# Patient Record
(demographics unavailable — no encounter records)

---

## 2024-10-10 NOTE — HISTORY OF PRESENT ILLNESS
[FreeTextEntry1] :   ***The patient was asked about all of the following, positive responses are listed below*** Gynecologic History: History of breast biopsy or breast cyst aspiration; Pain during or after intercourse; Vaginal bleeding or spotting between periods; Breast discharge; Abnormal or irregular periods; Abnormal vaginal discharge; history of Gynecologic cancer; history of Ovarian cysts; history of Fibroids; history of frequent Urinary tract infections; Urinary problems (i.e. frequency, urgency, difficulty, leaking); history of Pelvic pain/pelvic inflammatory disease; history of chronic Vaginal infections (i.e., yeast, Trichomonas, bacterial Vaginosis) Contraceptive History: What she is currently using for birth control; If she was requesting birth control today. Sexually Transmitted Disease History: A history of any of the following sexually transmitted diseases: Gonorrhea; Chlamydia; Syphilis; Herpes; HPV/Warts; Hepatitis; HIV/AIDS and whether she feels that she is at risk for HIV/AIDS and wants to be tested for HIV. Abnormal Pap Smear History: History of abnormal Pap smear; evaluation of any abnormalities; treatment of any abnormalities Date and result of her last Pap smear. NABIL in Utero Exposure History: A history of personal or maternal NABIL exposure. Hospitalizations (other than childbirth) Blood transfusions. Review of Systems: General: weight change, general health; Head: headaches, vertigo; Eyes: vision, diplopia; Ears: change in hearing, tinnitus; Nose: epistaxis, chronic rhinitis; Mouth: gingival bleeding; Neck: stiffness, pain, masses; Chest: dyspnea, wheezing, hemoptysis, persistent cough; Heart: chest pains, palpitations; Abdomen: liver disease, abdominal discomfort, Indigestion, Nausea/Vomiting, Constipation/Diarrhea, Blood in stool, change in bowel habits; : renal disease; Musculoskeletal: pain in muscles or joints, paresthesias or numbness; Skin: rashes, itching, pigmentation changes; Neurologic: weakness, tremor, seizures, changes in mentation; Psychiatric: depressive symptoms, changes in sleep habits. Surgery: History of gynecological surgery; History of non-gynecological surgery.   ***Pregnancy History*** # Pregnancies 2; # deliveries 2; # vaginal 2.   ***Menstrual History*** Age of first period:15; # of days between cycles:24-27; duration of period: 4-5 days; she does not have cramps with periods.   ***Sexual history*** She is sexually active; Coitarche: 15; Total # of lifetime partners:10; She denies having more than one current partner; She has been with her current partner for 13 years.   ***Medical history*** Alcohol abuse; Anemia; Anxiety; Arthritis/Lupus; Asthma; Blood clots in legs; Blood disorders; Breast problems; Cancer; Colitis; Depression; Diabetes; Drug Abuse; Eating disorder; Elevated cholesterol; Epilepsy/seizures; Eye problems/glaucoma; Gall bladder disease; Gout; Head or neck radiation; Hearing problems; Heart Disease; Hemorrhoid; Hepatitis or jaundice; High blood pressure; HIV/AIDS; Kidney disease; Low back problems; Neurological disorders; Osteoporosis; Other; Pneumonia; Rheumatic fever; Skin diseases; Stroke; Thyroid disease; Ulcers Family History - Cancers of the Breast; Cervix; Uterus; Ovarian; Lung; Colon; Other Vaccination Status - Whether she had all the usual childhood vaccinations or illness (negative answers recorded); Whether she was not vaccinated for HPV (All 3 doses) Social History - Current or past cigarette smoking; current or past alcohol abuse; current or past Marijuana use; current or past Cocaine use; current or past abuse of any other illegal or prescription drugs; current employment.   ***Preventative Care*** Date of the patient's last: Pap smear: 5/2024 Breast exam: [ ] Mammogram: 10/2024 Cholesterol check: [ ]   ***Social History*** The patient was asked about all of the following; positive responses are listed below: current or past cigarette smoking; Alcohol; current or past Marijuana use; current or past Cocaine use; current or past abuse of any other illegal or prescription drugs. Employment: Teacher   ***Significant positives*** She was not taking any medication at the time of the visit. She denied any medication allergies at the time of the visit. Her past gynecological history is significant for vaginal infections, irregular menstrual cycles, hirsutism, ovarian torsion/ruptured ovarian cyst? (oophorectomy). Her past gynecological history is not otherwise significant. She is sexually active and uses [ ] for birth control. She denies a history of abnormal Pap tests. Her last Pap test was 5/2024 and was normal. Her past medical history is not significant. Her past surgical history is significant for oophorectomy (ovarian torsion). Her past medical and surgical histories are not otherwise significant. Besides hospitalizations for surgeries and deliveries, she has not been hospitalized. She was not vaccinated for HPV. A 20-point review of systems was not significant. Her family's cancer history is not significant. She smokes cigarettes.

## 2024-12-20 NOTE — ASSESSMENT
[FreeTextEntry1] : Patient is a 38 yo F who presents for incidentally noted possible R punctate kidney stone and b/l mild hydronephrosis.  -Radiology reports of 12/9/24 renal/bladder sono, 12/13/24 abdominal sono and 12/13/24 CT scan obtained and reviewed -There was only b/l mild hydro and R punctate stone on one of 3 imaging.  D/w pt that CT is most accurate imaging as compared to sono which can be  dependent and mild b/l hydro is likely artifactual -Pt was very anxious about the findings, reassured pt.  Counseled pt on signs of renal colic - Counseled pt on stone dietary recommendations, such as increasing fluid intake and citrate intake (carla), while decreasing salt, protein and oxalate. -F/u prn

## 2024-12-20 NOTE — PHYSICAL EXAM
[General Appearance - Well Developed] : well developed [Normal Appearance] : normal appearance [General Appearance - In No Acute Distress] : no acute distress [] : no respiratory distress [Exaggerated Use Of Accessory Muscles For Inspiration] : no accessory muscle use [Abdomen Soft] : soft [Abdomen Tenderness] : non-tender [Costovertebral Angle Tenderness] : no ~M costovertebral angle tenderness [Urinary Bladder Findings] : the bladder was normal on palpation [Normal Station and Gait] : the gait and station were normal for the patient's age [No Focal Deficits] : no focal deficits [Oriented To Time, Place, And Person] : oriented to person, place, and time

## 2024-12-20 NOTE — HISTORY OF PRESENT ILLNESS
[FreeTextEntry1] : Patient is a 39-year-old F who presents for bilateral hydronephrosis and right nephrolithiasis.   She reports abdominal fullness/bloating sesnation recently which lasted for a couple of days, and subsided without intervention. She underwent renal/bladder sonogram 12/9/24 - unremarkable findings no hydro or stones. She then underwent CTAP 12/13 with contrast that was also unrevealing - no hydro or stones.  However at the same radiology facility, abdominal sonogram earlier that afternoon 12/13 noted 2 mm RLP, bilateral mild hydronephrosis.  Urine test negative on 12/9/24.   Reports voiding well, no urinary frequency, urgency. She states bilateral back/abd fullness sensation for a few months.  No dysuria or hematuria. Denies chronic constipation.  No prior history of nephrolithiasis.  Denies family history of  malignancy or nephrolithiasis.   Smokes cigarettes x 3-4 per day for 15 years.

## 2024-12-26 NOTE — PHYSICAL EXAM
[2+] : left foot dorsalis pedis 2+ [Sensation] : the sensory exam was normal to light touch and pinprick [No Focal Deficits] : no focal deficits [Deep Tendon Reflexes (DTR)] : deep tendon reflexes were 2+ and symmetric [Motor Exam] : the motor exam was normal [Ankle Swelling (On Exam)] : not present [Varicose Veins Of Lower Extremities] : not present [] : not present [FreeTextEntry3] : Hair growth noted on digits. Proximal to distal cooling is within normal limits.  [de-identified] : She has pain at the hallux IPJ. The extensor and flexor tendon are intact. There is good stability. There is mild swelling and mild bruising. [FreeTextEntry1] : Mild swelling and mild bruising at the right hallux. There is no fracture blister or open wound. [Diminished Throughout Right Foot] : normal sensation with monofilament testing throughout right foot [Diminished Throughout Left Foot] : normal sensation with monofilament testing throughout left foot

## 2024-12-26 NOTE — ASSESSMENT
[FreeTextEntry1] : Impression: Acute fracture distal phalanx, intra-articular. Pain right hallux.  Treatment: I gave her a toe splint that she will put on nice and loose. She is going to use a fracture shoe at all times and walk with an apropulsive gait. I gave her supplies. She will continue to splint the toe. I will see her back in 3 weeks and make a determination as to treatment going forward. I discussed fracture care, rest, ice and elevation.

## 2024-12-26 NOTE — CONSULT LETTER
[Dear  ___] : Dear  [unfilled], [Consult Letter:] : I had the pleasure of evaluating your patient, [unfilled]. [Please see my note below.] : Please see my note below. [Consult Closing:] : Thank you very much for allowing me to participate in the care of this patient.  If you have any questions, please do not hesitate to contact me. [Sincerely,] : Sincerely, [FreeTextEntry2] : Moiz Mccord  [facility] [address] [place] [address] Fax: [phone]  [FreeTextEntry3] : Elder Wyman DPM

## 2024-12-26 NOTE — PHYSICAL EXAM
[2+] : left foot dorsalis pedis 2+ [Sensation] : the sensory exam was normal to light touch and pinprick [No Focal Deficits] : no focal deficits [Deep Tendon Reflexes (DTR)] : deep tendon reflexes were 2+ and symmetric [Motor Exam] : the motor exam was normal [Ankle Swelling (On Exam)] : not present [Varicose Veins Of Lower Extremities] : not present [] : not present [FreeTextEntry3] : Hair growth noted on digits. Proximal to distal cooling is within normal limits.  [de-identified] : She has pain at the hallux IPJ. The extensor and flexor tendon are intact. There is good stability. There is mild swelling and mild bruising. [FreeTextEntry1] : Mild swelling and mild bruising at the right hallux. There is no fracture blister or open wound. [Diminished Throughout Right Foot] : normal sensation with monofilament testing throughout right foot [Diminished Throughout Left Foot] : normal sensation with monofilament testing throughout left foot

## 2024-12-26 NOTE — HISTORY OF PRESENT ILLNESS
[FreeTextEntry1] : Patient presents today. 2 days ago, she stubbed her right great toe arch and went to [facility]. She got x-rays. They put her into a splint and told her she broke her great toe. She has pain and difficulty. She does not like the splint at all. It is annoying and she presents today for evaluation.

## 2024-12-26 NOTE — PROCEDURE
[FreeTextEntry1] : X-ray were reviewed, which demonstrates complete fracture at the base of the distal phalanx of the right hallux that is intra-articular, but nondisplaced.

## 2024-12-26 NOTE — CONSULT LETTER
[Dear  ___] : Dear  [unfilled], [Consult Letter:] : I had the pleasure of evaluating your patient, [unfilled]. [Please see my note below.] : Please see my note below. [Consult Closing:] : Thank you very much for allowing me to participate in the care of this patient.  If you have any questions, please do not hesitate to contact me. [Sincerely,] : Sincerely, [FreeTextEntry2] : Moiz Mccord  [facility] [address] [place] [address] Fax: [phone]  [FreeTextEntry3] : Elder Wyman DPM

## 2024-12-26 NOTE — HISTORY OF PRESENT ILLNESS
[FreeTextEntry1] : Patient presents today. 2 days ago, she stubbed her right great toe arch and went to [facility]. She got x-rays. They put her into a splint and told her she broke her great toe. She has pain and difficulty. She does not like the splint at all. It is annoying and she presents today for evaluation.

## 2025-01-15 NOTE — PROCEDURE
[FreeTextEntry1] : X-rays taken to evaluate fracture healing. X-ray Report: (2 views - right foot) X-rays demonstrate good signs of osteogenesis and healing at the fracture site with congruous ankle joint.

## 2025-01-15 NOTE — HISTORY OF PRESENT ILLNESS
[FreeTextEntry1] : Patient presents today for follow-up intra-articular right great toe fracture. She has been splinting the toe.

## 2025-01-15 NOTE — PHYSICAL EXAM
[2+] : left foot dorsalis pedis 2+ [Sensation] : the sensory exam was normal to light touch and pinprick [No Focal Deficits] : no focal deficits [Motor Exam] : the motor exam was normal [Deep Tendon Reflexes (DTR)] : deep tendon reflexes were 2+ and symmetric [Ankle Swelling (On Exam)] : not present [Varicose Veins Of Lower Extremities] : not present [] : not present [FreeTextEntry3] : Hair growth noted on digits. Proximal to distal cooling is within normal limits.  [de-identified] : The pain is essentially resolved. There is good clinical signs of healing. Extensor and flexor tendon intact. The swelling is essentially resolved. [FreeTextEntry1] : There is still some black and blue and minor swelling, but overall is improved and progressing.  [Diminished Throughout Right Foot] : normal sensation with monofilament testing throughout right foot [Diminished Throughout Left Foot] : normal sensation with monofilament testing throughout left foot

## 2025-01-15 NOTE — ASSESSMENT
[FreeTextEntry1] : Impression: Acute fracture distal phalanx, intra-articular, right great toe. Pain.  Treatment: I don't want her to bend it or overly stress the toe for the next 2 weeks. No need to splint the toe anymore. Continue stability shoe and mild activity for 2 weeks and may resume full activity thereafter. Discharged from treatment. Follow-up in the office as needed.

## 2025-01-15 NOTE — PHYSICAL EXAM
[2+] : left foot dorsalis pedis 2+ [Sensation] : the sensory exam was normal to light touch and pinprick [No Focal Deficits] : no focal deficits [Deep Tendon Reflexes (DTR)] : deep tendon reflexes were 2+ and symmetric [Motor Exam] : the motor exam was normal [Ankle Swelling (On Exam)] : not present [Varicose Veins Of Lower Extremities] : not present [] : not present [FreeTextEntry3] : Hair growth noted on digits. Proximal to distal cooling is within normal limits.  [de-identified] : The pain is essentially resolved. There is good clinical signs of healing. Extensor and flexor tendon intact. The swelling is essentially resolved. [FreeTextEntry1] : There is still some black and blue and minor swelling, but overall is improved and progressing.  [Diminished Throughout Right Foot] : normal sensation with monofilament testing throughout right foot [Diminished Throughout Left Foot] : normal sensation with monofilament testing throughout left foot

## 2025-01-15 NOTE — PHYSICAL EXAM
[2+] : left foot dorsalis pedis 2+ [Sensation] : the sensory exam was normal to light touch and pinprick [No Focal Deficits] : no focal deficits [Deep Tendon Reflexes (DTR)] : deep tendon reflexes were 2+ and symmetric [Motor Exam] : the motor exam was normal [Ankle Swelling (On Exam)] : not present [Varicose Veins Of Lower Extremities] : not present [] : not present [FreeTextEntry3] : Hair growth noted on digits. Proximal to distal cooling is within normal limits.  [de-identified] : The pain is essentially resolved. There is good clinical signs of healing. Extensor and flexor tendon intact. The swelling is essentially resolved. [FreeTextEntry1] : There is still some black and blue and minor swelling, but overall is improved and progressing.  [Diminished Throughout Right Foot] : normal sensation with monofilament testing throughout right foot [Diminished Throughout Left Foot] : normal sensation with monofilament testing throughout left foot

## 2025-01-16 NOTE — PHYSICAL EXAM
[Chaperone Present] : A chaperone was present in the examining room during all aspects of the physical examination [TextEntry] : ***General*** General Appearance: normal; Judgment and insight: normal; the patient is oriented to time, place and person; Recent and remote memory: normal; Mood and affect: normal; Respiratory effort: normal.  ***Pelvic Examination*** External genitalia: the appearance and hair distribution are normal; no lesions are appreciated. Urethra/urethral meatus: no masses or tenderness are appreciated; there is no scarring noted. Bladder: nontender; there is no fullness appreciated; no masses were palpated. Vagina: the vagina is normally rugated; a white discharge is seen; no lesions are seen; pelvic support is adequate without any evidence of cystocele or rectocele. Cervix: normal in appearance; no overt lesions are seen. Uterus: Retroverted; normal in size, mobile, nontender, and well supported. Adnexa/parametria: nontender and not enlarged; no masses are palpated. A stool specimen was not indicated at this time.   ***Vaginal Discharge Evaluation*** A saline wet mount and KOH slide evaluation of the vaginal discharge were done. The discharge was white; the pH was elevated; the whiff test was negative; clue cells were not seen; hyphae were not seen; no lactobacilli were seen; no white blood cells were seen; superficial cells were seen; a candida culture was sent.   ***colposcopy of the vulva*** Colposcopy of the external genitalia showed that the labia majora and labia minora were normal. She identified the introitus as the location of her pain/itching/irritation. There was no vestibular tenderness of the anterior minor vestibular glands, and no vestibular tenderness of the posterior minor vestibular glands. There was a small scaly lesion with a red base in the upper left groin.  There was no evidence of other dermatopathology. There was moderate erythema of the introitus.

## 2025-01-16 NOTE — HISTORY OF PRESENT ILLNESS
[FreeTextEntry1] : The patient is 39 years old  2 para 2-0-0-2 whose last menstrual period was 2025.  She is referred for evaluation of recurrent vaginitis.  Patient has a history of yeast infections in the past.  3.5 years ago, she began getting infections more frequently (almost monthly for the past year).  She describes the yeast infection as a thick vaginal discharge with itching and swelling.  Vaginitis panels have been positive for Candida on at least 2 occasions (2024 and 2024).  She has been variously treated with fluconazole including once weekly fluconazole suppression for 2.5 months before she stopped due to side effects (systemic itching).  She has also tried clotrimazole 7 but stopped it after 3 days because it burned.  Finally, she uses boric acid suppositories on an as-needed basis (1 day at a time).  She had severe itching earlier this week and took boric acid suppositories on  in January 15 but has no symptoms today.  She has also been diagnosed with bacterial vaginosis (described as a fishy odor).  She was never treated.  She has no odor today. ***The patient was asked about all of the following, positive responses are listed below*** Gynecologic History: History of breast biopsy or breast cyst aspiration; Pain during or after intercourse; Vaginal bleeding or spotting between periods; Breast discharge; Abnormal or irregular periods; Abnormal vaginal discharge; history of Gynecologic cancer; history of Ovarian cysts; history of Fibroids; history of frequent Urinary tract infections; Urinary problems (i.e. frequency, urgency, difficulty, leaking); history of Pelvic pain/pelvic inflammatory disease; history of chronic Vaginal infections (i.e., yeast, Trichomonas, bacterial Vaginosis) Contraceptive History: What she is currently using for birth control; If she was requesting birth control today. Sexually Transmitted Disease History: A history of any of the following sexually transmitted diseases: Gonorrhea; Chlamydia; Syphilis; Herpes; HPV/Warts; Hepatitis; HIV/AIDS and whether she feels that she is at risk for HIV/AIDS and wants to be tested for HIV. Abnormal Pap Smear History: History of abnormal Pap smear; evaluation of any abnormalities; treatment of any abnormalities Date and result of her last Pap smear. NABIL in Utero Exposure History: A history of personal or maternal NABIL exposure. Hospitalizations (other than childbirth) Blood transfusions. Review of Systems: General: weight change, general health; Head: headaches, vertigo; Eyes: vision, diplopia; Ears: change in hearing, tinnitus; Nose: epistaxis, chronic rhinitis; Mouth: gingival bleeding; Neck: stiffness, pain, masses; Chest: dyspnea, wheezing, hemoptysis, persistent cough; Heart: chest pains, palpitations; Abdomen: liver disease, abdominal discomfort, Indigestion, Nausea/Vomiting, Constipation/Diarrhea, Blood in stool, change in bowel habits; : renal disease; Musculoskeletal: pain in muscles or joints, paresthesias or numbness; Skin: rashes, itching, pigmentation changes; Neurologic: weakness, tremor, seizures, changes in mentation; Psychiatric: depressive symptoms, changes in sleep habits. Surgery: History of gynecological surgery; History of non-gynecological surgery.   ***Pregnancy History*** # Pregnancies 2; # deliveries 2; # vaginal 2.  ***Menstrual History*** Age of first period:15; # of days between cycles:24-27; duration of period: 4-5 days; she does not have cramps with periods.   ***Sexual history*** She is sexually active; Coitarche: 15; Total # of lifetime partners:10; She denies having more than one current partner; She has been with her current partner for 13 years.   ***Medical history*** Alcohol abuse; Anemia; Anxiety; Arthritis/Lupus; Asthma; Blood clots in legs; Blood disorders; Breast problems; Cancer; Colitis; Depression; Diabetes; Drug Abuse; Eating disorder; Elevated cholesterol; Epilepsy/seizures; Eye problems/glaucoma; Gall bladder disease; Gout; Head or neck radiation; Hearing problems; Heart Disease; Hemorrhoid; Hepatitis or jaundice; High blood pressure; HIV/AIDS; Kidney disease; Low back problems; Neurological disorders; Osteoporosis; Other; Pneumonia; Rheumatic fever; Skin diseases; Stroke; Thyroid disease; Ulcers Family History - Cancers of the Breast; Cervix; Uterus; Ovarian; Lung; Colon; Other Vaccination Status - Whether she had all the usual childhood vaccinations or illness (negative answers recorded); Whether she was not vaccinated for HPV (All 3 doses) Social History - Current or past cigarette smoking; current or past alcohol abuse; current or past Marijuana use; current or past Cocaine use; current or past abuse of any other illegal or prescription drugs; current employment.   ***Preventative Care*** Date of the patient's last: Pap smear: 2024 Mammogram: 10/2024   ***Social History*** The patient was asked about all of the following; positive responses are listed below: current or past cigarette smoking; Alcohol; current or past Marijuana use; current or past Cocaine use; current or past abuse of any other illegal or prescription drugs. 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   ***Significant positives*** She was not taking any medication at the time of the visit.  She denied any medication allergies at the time of the visit. Her past gynecological history is significant for vaginal infection/yeast/BV and history of ovarian torsion with oophorectomy. Her past gynecological history is not otherwise significant. She is sexually active and uses nothing for birth control. She denies a history of abnormal Pap tests. Her last Pap test was 2024 and was normal. Her past medical history is not significant. Her past surgical history is significant for fixation of ovarian torsion (). Her past medical and surgical histories are not otherwise significant. Besides hospitalizations for surgeries and deliveries, she has not been hospitalized. She was not vaccinated for HPV. A 20-point review of systems was not significant. Her family's cancer history is not significant. She smokes occasionally.

## 2025-01-16 NOTE — PLAN
[FreeTextEntry1] : I recommended that she call for the results of her yeast culture in 1 week.  I recommended that she avoid irritants and gave her a list of irritants to avoid.  I told her to try using Vaseline to improve the vulvas barrier function.  The patient preferred to start using boric acid suppositories daily today so, if her culture is positive I will likely complete treatment with boric acid suppositories.  If the culture is negative, she should return for a repeat evaluation in 1 month.

## 2025-01-16 NOTE — ASSESSMENT
[TextEntry] : The patient has had frequent yeast infections for the past 3.5 years and almost monthly for the past year.  At least 2 vaginitis panels were positive (September 2024 and November 2024).  She has been variously treated with fluconazole including once weekly fluconazole suppression which she discontinued after 2.5 months because of systemic itching.  She has also been treated with clotrimazole 7 which she discontinued after 3 days because of vulvar burning.  She uses boric acid suppositories on an as-needed basis (1 day at a time).  Earlier this week, she was itching severely but has used boric acid in 2 of the last 3 days and was asymptomatic today.  She has also tested positive for Gardnerella on several occasions and had a fishy vaginal odor in the past but not recently.  There was introital vulvar erythema on examination but no Candida on wet prep (pending culture) and no evidence of BV (by Amsel's criteria).  Although the positive vaginitis panels confirms that she has had Candida on several occasions, today, she either has occult Candida (possibly due to recent use of boric acid suppositories) or contact dermatitis. 48 minutes of total time was spent on the date of the encounter. This included time for review of medical records and laboratory results, face to face time (including the physical examination counseling and coordination of care), time for patient education, treatment plans, answering questions, communicating with other doctors and for medical record documentation.  The time spent is separate from time spent on separately billed procedures.

## 2025-02-06 NOTE — PHYSICAL EXAM
[Chaperone Present] : A chaperone was present in the examining room during all aspects of the physical examination [TextEntry] : ***General*** General Appearance: normal; Judgment and insight: normal; the patient is oriented to time, place and person; Recent and remote memory: normal; Mood and affect: normal; Respiratory effort: normal.  ***Pelvic Examination*** External genitalia: the appearance and hair distribution are normal; no lesions are appreciated. Urethra/urethral meatus: no masses or tenderness are appreciated; there is no scarring noted. Bladder: nontender; there is no fullness appreciated; no masses were palpated. Vagina: the vagina is normally rugated; a white discharge is seen; no lesions are seen; pelvic support is adequate without any evidence of cystocele or rectocele. Cervix: normal in appearance; no overt lesions are seen. Uterus: Retroverted; normal in size, mobile, nontender, and well supported. Adnexa/parametria: nontender and not enlarged; no masses are palpated. A stool specimen was not indicated at this time.  ***Vaginal Discharge Evaluation*** A saline wet mount and KOH slide evaluation of the vaginal discharge were done. The discharge was white; the pH was normal; the whiff test was negative; clue cells were not seen; hyphae were seen; budding yeast were seen; no lactobacilli were seen; no white blood cells were seen; superficial cells were seen; a candida culture was sent.  ***colposcopy of the vulva*** Colposcopy of the external genitalia showed that the labia majora and labia minora were normal. She identified the introitus as the location of her pain/itching/irritation. There was no vestibular tenderness of the anterior minor vestibular glands, and no vestibular tenderness of the posterior minor vestibular glands. There was no evidence of other dermatopathology. There was moderate erythema of the introitus.

## 2025-02-06 NOTE — HISTORY OF PRESENT ILLNESS
[FreeTextEntry1] : The patient has had frequent yeast infections for the past 3.5 years and almost monthly for the past year. At least 2 vaginitis panels were positive (September 2024 and November 2024) and when she was seen on January 16, 2025, she had fluconazole sensitive Candida.  In the past, she has been variously treated with fluconazole including once weekly fluconazole suppression which she discontinued after 2.5 months because of systemic itching.  She completed 14 days of boric acid suppositories 1 week ago and felt completely better but by 3 days ago, the itching began to return and she used 1 more boric acid suppository.  She is complaining of itching and swelling now

## 2025-02-06 NOTE — ASSESSMENT
[TextEntry] : The patient has recurrent or resistant Candida vulvovaginitis (or both).  She felt better after 14 days of boric acid suppositories but her infection returned within 3 to 4 days of completing treatment.  Her most recent infection was fluconazole sensitive but the patient had been on 2.5 months of weekly fluconazole suppression in the past and developed systemic itching.  She may be allergic to fluconazole.  26 minutes of total time was spent on the date of the encounter. This included time for review of medical records and laboratory results, face to face time (including the physical examination counseling and coordination of care), time for patient education, treatment plans, answering questions, communicating with other doctors and for medical record documentation.  The time spent is separate from time spent on separately billed procedures.

## 2025-02-06 NOTE — PLAN
[FreeTextEntry1] :  I discussed 3 options with the patient.  She can use boric acid suppositories once or twice daily for at least 14 days and up to 28 days.  She can either return at the end of treatment (while she is still using the suppositories) to confirm cure or go on 3 times weekly boric acid suppositories for suppression and return during suppression.  I referred the patient to Dr. Mitch Boxer for an opinion regarding her possible fluconazole allergy.  I am interested in whether it is safe for her to try fluconazole again, whether she can use other azole's such as miconazole, clotrimazole or Oteseconazole.  What she should do if she develops a reaction to fluconazole?  Is fluconazole safe in treatment mode but not in suppression mode?  Is Oteseconazole a problem because it is so long-acting?  Once answers are available to all these questions, I will be able to make further recommendations.

## 2025-02-20 NOTE — PHYSICAL EXAM
[General Appearance - Alert] : alert [General Appearance - In No Acute Distress] : in no acute distress [General Appearance - Well Nourished] : well nourished [General Appearance - Well-Appearing] : healthy appearing [Neck Appearance] : the appearance of the neck was normal [Neck Cervical Mass (___cm)] : no neck mass was observed [Respiration, Rhythm And Depth] : normal respiratory rhythm and effort [Exaggerated Use Of Accessory Muscles For Inspiration] : no accessory muscle use [Musculoskeletal - Swelling] : no joint swelling [Range of Motion to Joints] : range of motion to joints [Skin Color & Pigmentation] : normal skin color and pigmentation [] : no rash [Oriented To Time, Place, And Person] : oriented to person, place, and time [Affect] : the affect was normal

## 2025-02-21 NOTE — HISTORY OF PRESENT ILLNESS
[FreeTextEntry1] : This 39 year old mother of 2 in general good health has been coping with chronic yeast vaginitis.  With each episode she notes marked local swelling, tenderness along with general malaise. She has had Candida albicans cultured which is susceptible to Fluconazole. She has had inability to tolerate Fluconazole - experiencing marked flushing, tiching. She has had improvement with boric acid suppositories.  She notes these episodes have been frequent since 10/21 when she discontinued low dose oral contraceptive.  She is in otherwise excellent health, takes no other medications apart from vitamina and probiotics no tobacco, etoh she exercises regularly

## 2025-02-21 NOTE — CONSULT LETTER
[Dear  ___] : Dear  [unfilled], [Consult Letter:] : I had the pleasure of evaluating your patient, [unfilled]. [Please see my note below.] : Please see my note below. [Consult Closing:] : Thank you very much for allowing me to participate in the care of this patient.  If you have any questions, please do not hesitate to contact me. [Sincerely,] : Sincerely, [FreeTextEntry2] : Dr Moiz Buenrostro at [address], [address] Floor,  [place] [address] [FreeTextEntry3] : Igor Malcolm MD FACP ISIDRO AAHIRAUDEL Infectious Diseases [facility]

## 2025-02-21 NOTE — ASSESSMENT
[FreeTextEntry1] : Usual risk factor associated with vaginal candidiasis includes antibiotic administration, hyperglycemia, steroids or cushings.  These appear unlikley in current instance. She notes that she has been screened and has tested negative for HIV and STDs. She is getting good relief with boric acid intravaginal treatments which she should continue.  The primary source of C albicans is distal gut. Dietary manipulation to optimize her gut microbiome and suppress Candida may be useful. The use of fiber for its prebiotic benefit was discussed in detail and practical implmentation was discussed. The use of fermented foods and some probiotics were discussed in detail and practical implementation was discussed.  Labs including HgbA1C, afternoon Cortisol level and CBC with diff were drawn. I will inform pt of results.  ADD HgbA1c = 5.0; TSH = 1/26; PM Cortisol 5.7' WBC = 6.33 pt called and informed of normal values.

## 2025-03-04 NOTE — ASSESSMENT
[FreeTextEntry1] : Mrs. Tse tolerates intermittent Diflucan with no associated itching.   If she was truly allergic to this antifungal medication she would not be able to tolerate intermittent doses of the medication.   In addition, I have found no evidence of any underlying food allergies.   I have advised Mrs. Tse to start Allegra 180 mg QD when she takes Diflucan preventatively or when she takes the medication at the onset of a vaginal yeast infection

## 2025-03-04 NOTE — HISTORY OF PRESENT ILLNESS
[de-identified] : Patient with recurrent vaginal yeast infection -  treated with Diflucan.    This summer she noted itching of her skin when she was taking Diflucan.   She was treated preventatively 1x per week on a regular basis - stopped the treatment over the summer because she felt an itchy sensation with no rash.   She then took Diflucan as needed during the fall months with no problem.   More recently she took a second dosage two days later - and she noted a burning sensation - itchy that day with no rash.   She has not taken Diflucan since February 12th.     Dr. Sandoval was concerned about the use of this antifungal medication.   She is not sure if there is an alternative medication.   She uses boric acid - no local itching  She expressed concern about food allergies - she will note sporadic itch unrelated to Diflucan   Recent lab testing normal.

## 2025-03-04 NOTE — SOCIAL HISTORY
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

## 2025-03-04 NOTE — PHYSICAL EXAM
[Alert] : alert [Well Nourished] : well nourished [Healthy Appearance] : healthy appearance [No Acute Distress] : no acute distress [Well Developed] : well developed [Normal Voice/Communication] : normal voice communication [No Neck Mass] : no neck mass was observed [No LAD] : no lymphadenopathy